# Patient Record
Sex: FEMALE | Race: WHITE | HISPANIC OR LATINO
[De-identification: names, ages, dates, MRNs, and addresses within clinical notes are randomized per-mention and may not be internally consistent; named-entity substitution may affect disease eponyms.]

---

## 2023-09-05 PROBLEM — Z00.00 ENCOUNTER FOR PREVENTIVE HEALTH EXAMINATION: Status: ACTIVE | Noted: 2023-09-05

## 2023-09-06 ENCOUNTER — APPOINTMENT (OUTPATIENT)
Dept: ORTHOPEDIC SURGERY | Facility: CLINIC | Age: 88
End: 2023-09-06
Payer: MEDICARE

## 2023-09-06 VITALS — BODY MASS INDEX: 24.97 KG/M2 | HEIGHT: 56 IN | WEIGHT: 111 LBS

## 2023-09-06 DIAGNOSIS — Z78.9 OTHER SPECIFIED HEALTH STATUS: ICD-10-CM

## 2023-09-06 DIAGNOSIS — M19.90 UNSPECIFIED OSTEOARTHRITIS, UNSPECIFIED SITE: ICD-10-CM

## 2023-09-06 DIAGNOSIS — M81.0 AGE-RELATED OSTEOPOROSIS W/OUT CURRENT PATHOLOGICAL FRACTURE: ICD-10-CM

## 2023-09-06 DIAGNOSIS — E03.9 HYPOTHYROIDISM, UNSPECIFIED: ICD-10-CM

## 2023-09-06 DIAGNOSIS — I10 ESSENTIAL (PRIMARY) HYPERTENSION: ICD-10-CM

## 2023-09-06 PROCEDURE — L3908: CPT | Mod: KX,RT

## 2023-09-06 PROCEDURE — 99204 OFFICE O/P NEW MOD 45 MIN: CPT

## 2023-09-06 RX ORDER — LOSARTAN POTASSIUM AND HYDROCHLOROTHIAZIDE 25; 100 MG/1; MG/1
100-25 TABLET ORAL
Refills: 0 | Status: ACTIVE | COMMUNITY

## 2023-09-06 RX ORDER — POTASSIUM CHLORIDE 20 MEQ
20 TABLET, EXT RELEASE, PARTICLES/CRYSTALS ORAL
Refills: 0 | Status: ACTIVE | COMMUNITY

## 2023-09-06 RX ORDER — BENZOCAINE/BENZALKONIUM/ALOE/E 5 %-0.13 %
1500 CREAM (GRAM) TOPICAL
Refills: 0 | Status: ACTIVE | COMMUNITY

## 2023-09-06 RX ORDER — LEVOTHYROXINE SODIUM 0.07 MG/1
75 TABLET ORAL
Refills: 0 | Status: ACTIVE | COMMUNITY

## 2023-09-06 RX ORDER — CLONIDINE HYDROCHLORIDE 0.1 MG/1
0.1 TABLET ORAL
Refills: 0 | Status: ACTIVE | COMMUNITY

## 2023-09-06 RX ORDER — NEBIVOLOL HYDROCHLORIDE 10 MG/1
10 TABLET ORAL
Refills: 0 | Status: ACTIVE | COMMUNITY

## 2023-09-06 RX ORDER — DICLOFENAC SODIUM 10 MG/G
1 GEL TOPICAL
Refills: 0 | Status: ACTIVE | COMMUNITY

## 2023-09-06 RX ORDER — ALENDRONATE SODIUM 70 MG/1
70 TABLET ORAL
Refills: 0 | Status: ACTIVE | COMMUNITY

## 2023-09-06 NOTE — HISTORY OF PRESENT ILLNESS
[de-identified] : Age: 89F PMHx: HTN, Hypothyroidism, Arthritis, Osteporosis Hand Dominance: RHD Chief Complaint: right wrist pain s/p fall 09/03/23. Patient states that she was kicking a piece of ice when she lost her footing, falling backwards onto her right wrist. Patient went to [facility] that night and had her wrist reduced. Denies numbness/tingling. Trauma: yes Outside Imaging/Treatment: X-rays at [facility] 09/03/23 OTC Medications: Tylenol arthritis with some relief OT/PT: none Bracing: wrist currently wrapped Pain worse with: exertion Pain better with: Tylenol arthritis, rest

## 2023-09-06 NOTE — ASSESSMENT
[FreeTextEntry1] : Right distal radius fracture- will manage with closed management  Reviewed radiographs with patient and discussed pathoanatomy. Discussed alignment is within acceptable parameters to manage with closed management in brace. Discussed risk of stiffness, late tendon injury, and displacement requiring operative intervention. NWB, elevate, NSAIDs prn.  Right wrist brace provided.  F/u 3weeks; reassess, repeat films; goign to [facility] Oct 6th

## 2023-09-06 NOTE — IMAGING
[de-identified] : Right wrist with mild swelling, skin intact, no ecchymosis. +ttp at radial metaphysis, +ttp at radial styloid, Listers, or ulnar styloid. Able to make fist, oppose thumb to small finger and abduct fingers. Sensation intact throughout. <2sec cap refill.   Right wrist radiographs with distal radius fracture, dorsally angulated

## 2023-10-04 ENCOUNTER — APPOINTMENT (OUTPATIENT)
Dept: ORTHOPEDIC SURGERY | Facility: CLINIC | Age: 88
End: 2023-10-04
Payer: MEDICARE

## 2023-10-04 VITALS — WEIGHT: 111 LBS | BODY MASS INDEX: 24.97 KG/M2 | HEIGHT: 56 IN

## 2023-10-04 PROCEDURE — 99213 OFFICE O/P EST LOW 20 MIN: CPT

## 2023-10-04 PROCEDURE — 73110 X-RAY EXAM OF WRIST: CPT | Mod: RT

## 2023-11-27 ENCOUNTER — APPOINTMENT (OUTPATIENT)
Dept: ORTHOPEDIC SURGERY | Facility: CLINIC | Age: 88
End: 2023-11-27

## 2024-01-03 ENCOUNTER — APPOINTMENT (OUTPATIENT)
Dept: ORTHOPEDIC SURGERY | Facility: CLINIC | Age: 89
End: 2024-01-03
Payer: MEDICARE

## 2024-01-03 DIAGNOSIS — S62.101A FRACTURE OF UNSPECIFIED CARPAL BONE, RIGHT WRIST, INITIAL ENCOUNTER FOR CLOSED FRACTURE: ICD-10-CM

## 2024-01-03 PROCEDURE — 73110 X-RAY EXAM OF WRIST: CPT | Mod: RT

## 2024-01-03 PROCEDURE — 99213 OFFICE O/P EST LOW 20 MIN: CPT

## 2024-01-03 NOTE — HISTORY OF PRESENT ILLNESS
[de-identified] : Age: 89F PMHx: HTN, Hypothyroidism, Arthritis, Osteporosis Hand Dominance: RHD Chief Complaint: right wrist pain s/p fall 09/03/23. Patient states that she was kicking a piece of ice when she lost her footing, falling backwards onto her right wrist. Patient went to [facility] that night and had her wrist reduced. Denies numbness/tingling. Trauma: yes Outside Imaging/Treatment: X-rays at [facility] 09/03/23 OTC Medications: Tylenol arthritis with some relief OT/PT: none Bracing: wrist currently wrapped Pain worse with: exertion Pain better with: Tylenol arthritis, rest  10/04/23: f/u right wrist. Patient reports no pain and reduced swelling in the wrist. Reports diffuse swelling in the arm but reports that it is secondary to her lymphedema. Denies numbness/tingling.   1/3/24 f/u right wrist fracture. Patient reports no pain and reduced swelling in the wrist. Denies nubness/tingling. Patient denies wearin the wrist brace, has been wearing the lymphedema sleeve.

## 2024-01-03 NOTE — IMAGING
[de-identified] : Right wrist with mild swelling, skin intact, no ecchymosis. -ttp at radial metaphysis, -ttp at radial styloid, Listers, or ulnar styloid. Able to make fist, oppose thumb to small finger and abduct fingers. Sensation intact throughout. <2sec cap refill. +lymphedema Much finger stiffness  Right wrist radiographs with distal radius fracture, dorsally angulated. Alignment maintained, healed.

## 2024-01-03 NOTE — ASSESSMENT
[FreeTextEntry1] : Right distal radius fracture- will continue to manage with closed management  Reviewed radiographs with patient and discussed pathoanatomy. Discussed alignment is within acceptable parameters to manage with closed management in brace. Discussed risk of stiffness, late tendon injury, and displacement requiring operative intervention. WBAT , elevate, NSAIDs prn. OT for ROM.  D/c brace  F/u 8weeks; reassess, repeat films.

## 2024-03-06 ENCOUNTER — APPOINTMENT (OUTPATIENT)
Dept: ORTHOPEDIC SURGERY | Facility: CLINIC | Age: 89
End: 2024-03-06

## 2024-07-22 ENCOUNTER — APPOINTMENT (OUTPATIENT)
Dept: ORTHOPEDIC SURGERY | Facility: CLINIC | Age: 89
End: 2024-07-22

## 2024-07-22 VITALS — WEIGHT: 111 LBS | HEIGHT: 56 IN | BODY MASS INDEX: 24.97 KG/M2

## 2024-07-22 PROCEDURE — 99214 OFFICE O/P EST MOD 30 MIN: CPT

## 2024-07-22 PROCEDURE — 73030 X-RAY EXAM OF SHOULDER: CPT | Mod: RT

## 2024-07-23 NOTE — IMAGING
[de-identified] : Right wrist with mild swelling, skin intact, no ecchymosis. -ttp at radial metaphysis, -ttp at radial styloid, Listers, or ulnar styloid. Able to make fist, oppose thumb to small finger and abduct fingers. Sensation intact throughout. <2sec cap refill. +lymphedema Much finger stiffness  Right wrist radiographs with distal radius fracture, dorsally angulated. Alignment maintained, healed.

## 2024-07-23 NOTE — HISTORY OF PRESENT ILLNESS
[de-identified] : Age: 89F PMHx: HTN, Hypothyroidism, Arthritis, Osteporosis Hand Dominance: RHD Chief Complaint: right wrist pain s/p fall 09/03/23. Patient states that she was kicking a piece of ice when she lost her footing, falling backwards onto her right wrist. Patient went to [facility] that night and had her wrist reduced. Denies numbness/tingling. Trauma: yes Outside Imaging/Treatment: X-rays at [facility] 09/03/23 OTC Medications: Tylenol arthritis with some relief OT/PT: none Bracing: wrist currently wrapped Pain worse with: exertion Pain better with: Tylenol arthritis, rest  10/04/23: f/u right wrist. Patient reports no pain and reduced swelling in the wrist. Reports diffuse swelling in the arm but reports that it is secondary to her lymphedema. Denies numbness/tingling.   1/3/24 f/u right wrist fracture. Patient reports no pain and reduced swelling in the wrist. Denies numbness/tingling. Patient denies wearing the wrist brace, has been wearing the lymphedema sleeve.   **** NEW COMPLAINT*** 07/22/24: Patient presents with right shoulder pain onset approx. October 2023. Patient reports that she has been attending therapy for her right wrist and states that her right shoulder has been very tight with reduced ROM. Patient states that she is unable to lift her right arm up past her shoulder, prompting her to get evaluated. Patient's OT states that she may have a locked shoulder. Denies numbness/tingling.

## 2024-07-23 NOTE — ASSESSMENT
[FreeTextEntry1] : Right distal radius fracture- will continue to manage with closed management  Reviewed radiographs with patient and discussed pathoanatomy. Discussed alignment is within acceptable parameters to manage with closed management in brace. Discussed risk of stiffness, late tendon injury, and displacement requiring operative intervention. WBAT , elevate, NSAIDs prn. OT for ROM.  D/c brace  F/u 8weeks; reassess, repeat films.    right frozen shoudler - pt

## 2024-07-23 NOTE — IMAGING
[de-identified] : Right wrist with mild swelling, skin intact, no ecchymosis. -ttp at radial metaphysis, -ttp at radial styloid, Listers, or ulnar styloid. Able to make fist, oppose thumb to small finger and abduct fingers. Sensation intact throughout. <2sec cap refill. +lymphedema Much finger stiffness  Right wrist radiographs with distal radius fracture, dorsally angulated. Alignment maintained, healed.

## 2024-10-21 ENCOUNTER — APPOINTMENT (OUTPATIENT)
Dept: ORTHOPEDIC SURGERY | Facility: CLINIC | Age: 89
End: 2024-10-21
Payer: MEDICARE

## 2024-10-21 VITALS — BODY MASS INDEX: 24.97 KG/M2 | HEIGHT: 56 IN | WEIGHT: 111 LBS

## 2024-10-21 DIAGNOSIS — M75.00 ADHESIVE CAPSULITIS OF UNSPECIFIED SHOULDER: ICD-10-CM

## 2024-10-21 DIAGNOSIS — M19.90 UNSPECIFIED OSTEOARTHRITIS, UNSPECIFIED SITE: ICD-10-CM

## 2024-10-21 PROCEDURE — 99213 OFFICE O/P EST LOW 20 MIN: CPT
